# Patient Record
Sex: FEMALE | Race: WHITE | NOT HISPANIC OR LATINO | Employment: UNEMPLOYED | URBAN - METROPOLITAN AREA
[De-identification: names, ages, dates, MRNs, and addresses within clinical notes are randomized per-mention and may not be internally consistent; named-entity substitution may affect disease eponyms.]

---

## 2020-10-31 ENCOUNTER — APPOINTMENT (OUTPATIENT)
Dept: RADIOLOGY | Facility: MEDICAL CENTER | Age: 20
End: 2020-10-31
Attending: EMERGENCY MEDICINE
Payer: COMMERCIAL

## 2020-10-31 ENCOUNTER — HOSPITAL ENCOUNTER (EMERGENCY)
Facility: MEDICAL CENTER | Age: 20
End: 2020-10-31
Attending: EMERGENCY MEDICINE | Admitting: EMERGENCY MEDICINE
Payer: COMMERCIAL

## 2020-10-31 VITALS
TEMPERATURE: 97.5 F | HEART RATE: 56 BPM | DIASTOLIC BLOOD PRESSURE: 88 MMHG | OXYGEN SATURATION: 95 % | RESPIRATION RATE: 16 BRPM | SYSTOLIC BLOOD PRESSURE: 140 MMHG

## 2020-10-31 DIAGNOSIS — R11.2 NON-INTRACTABLE VOMITING WITH NAUSEA, UNSPECIFIED VOMITING TYPE: ICD-10-CM

## 2020-10-31 DIAGNOSIS — R10.13 EPIGASTRIC ABDOMINAL PAIN: ICD-10-CM

## 2020-10-31 DIAGNOSIS — D64.9 ANEMIA, UNSPECIFIED TYPE: ICD-10-CM

## 2020-10-31 LAB
ALBUMIN SERPL BCP-MCNC: 3.7 G/DL (ref 3.2–4.9)
ALBUMIN/GLOB SERPL: 1.4 G/DL
ALP SERPL-CCNC: 79 U/L (ref 30–99)
ALT SERPL-CCNC: 12 U/L (ref 2–50)
ANION GAP SERPL CALC-SCNC: 9 MMOL/L (ref 7–16)
AST SERPL-CCNC: 14 U/L (ref 12–45)
B-HCG SERPL-ACNC: <0.5 MIU/ML (ref 0–10)
BASOPHILS # BLD AUTO: 0.6 % (ref 0–1.8)
BASOPHILS # BLD: 0.05 K/UL (ref 0–0.12)
BILIRUB SERPL-MCNC: 0.3 MG/DL (ref 0.1–1.5)
BUN SERPL-MCNC: 7 MG/DL (ref 8–22)
CALCIUM SERPL-MCNC: 8.1 MG/DL (ref 8.4–10.2)
CHLORIDE SERPL-SCNC: 109 MMOL/L (ref 96–112)
CO2 SERPL-SCNC: 20 MMOL/L (ref 20–33)
CREAT SERPL-MCNC: 0.56 MG/DL (ref 0.5–1.4)
EOSINOPHIL # BLD AUTO: 0.17 K/UL (ref 0–0.51)
EOSINOPHIL NFR BLD: 1.9 % (ref 0–6.9)
ERYTHROCYTE [DISTWIDTH] IN BLOOD BY AUTOMATED COUNT: 46.9 FL (ref 35.9–50)
GLOBULIN SER CALC-MCNC: 2.7 G/DL (ref 1.9–3.5)
GLUCOSE SERPL-MCNC: 95 MG/DL (ref 65–99)
HCT VFR BLD AUTO: 31.5 % (ref 37–47)
HGB BLD-MCNC: 9.7 G/DL (ref 12–16)
IMM GRANULOCYTES # BLD AUTO: 0.03 K/UL (ref 0–0.11)
IMM GRANULOCYTES NFR BLD AUTO: 0.3 % (ref 0–0.9)
LIPASE SERPL-CCNC: 17 U/L (ref 7–58)
LYMPHOCYTES # BLD AUTO: 1.8 K/UL (ref 1–4.8)
LYMPHOCYTES NFR BLD: 20.2 % (ref 22–41)
MCH RBC QN AUTO: 21.9 PG (ref 27–33)
MCHC RBC AUTO-ENTMCNC: 30.8 G/DL (ref 33.6–35)
MCV RBC AUTO: 71.1 FL (ref 81.4–97.8)
MONOCYTES # BLD AUTO: 0.63 K/UL (ref 0–0.85)
MONOCYTES NFR BLD AUTO: 7.1 % (ref 0–13.4)
NEUTROPHILS # BLD AUTO: 6.24 K/UL (ref 2–7.15)
NEUTROPHILS NFR BLD: 69.9 % (ref 44–72)
NRBC # BLD AUTO: 0 K/UL
NRBC BLD-RTO: 0 /100 WBC
PLATELET # BLD AUTO: 357 K/UL (ref 164–446)
PMV BLD AUTO: 10.2 FL (ref 9–12.9)
POTASSIUM SERPL-SCNC: 3.7 MMOL/L (ref 3.6–5.5)
PROT SERPL-MCNC: 6.4 G/DL (ref 6–8.2)
RBC # BLD AUTO: 4.43 M/UL (ref 4.2–5.4)
SODIUM SERPL-SCNC: 138 MMOL/L (ref 135–145)
WBC # BLD AUTO: 8.9 K/UL (ref 4.8–10.8)

## 2020-10-31 PROCEDURE — 85025 COMPLETE CBC W/AUTO DIFF WBC: CPT

## 2020-10-31 PROCEDURE — 36415 COLL VENOUS BLD VENIPUNCTURE: CPT

## 2020-10-31 PROCEDURE — 84702 CHORIONIC GONADOTROPIN TEST: CPT

## 2020-10-31 PROCEDURE — 96375 TX/PRO/DX INJ NEW DRUG ADDON: CPT

## 2020-10-31 PROCEDURE — 700111 HCHG RX REV CODE 636 W/ 250 OVERRIDE (IP): Performed by: EMERGENCY MEDICINE

## 2020-10-31 PROCEDURE — 83690 ASSAY OF LIPASE: CPT

## 2020-10-31 PROCEDURE — 700105 HCHG RX REV CODE 258: Performed by: EMERGENCY MEDICINE

## 2020-10-31 PROCEDURE — 71045 X-RAY EXAM CHEST 1 VIEW: CPT

## 2020-10-31 PROCEDURE — 700102 HCHG RX REV CODE 250 W/ 637 OVERRIDE(OP): Performed by: EMERGENCY MEDICINE

## 2020-10-31 PROCEDURE — 99285 EMERGENCY DEPT VISIT HI MDM: CPT

## 2020-10-31 PROCEDURE — A9270 NON-COVERED ITEM OR SERVICE: HCPCS | Performed by: EMERGENCY MEDICINE

## 2020-10-31 PROCEDURE — 96374 THER/PROPH/DIAG INJ IV PUSH: CPT

## 2020-10-31 PROCEDURE — 80053 COMPREHEN METABOLIC PANEL: CPT

## 2020-10-31 RX ORDER — FAMOTIDINE 40 MG/1
40 TABLET, FILM COATED ORAL DAILY
Qty: 30 TAB | Refills: 0 | Status: SHIPPED | OUTPATIENT
Start: 2020-10-31 | End: 2020-11-30

## 2020-10-31 RX ORDER — METOCLOPRAMIDE HYDROCHLORIDE 5 MG/ML
10 INJECTION INTRAMUSCULAR; INTRAVENOUS ONCE
Status: COMPLETED | OUTPATIENT
Start: 2020-10-31 | End: 2020-10-31

## 2020-10-31 RX ORDER — DIPHENHYDRAMINE HYDROCHLORIDE 50 MG/ML
25 INJECTION INTRAMUSCULAR; INTRAVENOUS ONCE
Status: COMPLETED | OUTPATIENT
Start: 2020-10-31 | End: 2020-10-31

## 2020-10-31 RX ORDER — SODIUM CHLORIDE 9 MG/ML
1000 INJECTION, SOLUTION INTRAVENOUS ONCE
Status: COMPLETED | OUTPATIENT
Start: 2020-10-31 | End: 2020-10-31

## 2020-10-31 RX ORDER — ONDANSETRON 4 MG/1
4 TABLET, ORALLY DISINTEGRATING ORAL EVERY 6 HOURS PRN
Qty: 10 TAB | Refills: 0 | Status: SHIPPED | OUTPATIENT
Start: 2020-10-31 | End: 2020-11-03

## 2020-10-31 RX ADMIN — METOCLOPRAMIDE 10 MG: 5 INJECTION, SOLUTION INTRAMUSCULAR; INTRAVENOUS at 13:27

## 2020-10-31 RX ADMIN — SODIUM CHLORIDE 1000 ML: 9 INJECTION, SOLUTION INTRAVENOUS at 13:23

## 2020-10-31 RX ADMIN — DIPHENHYDRAMINE HYDROCHLORIDE 25 MG: 50 INJECTION INTRAMUSCULAR; INTRAVENOUS at 13:24

## 2020-10-31 RX ADMIN — LIDOCAINE HYDROCHLORIDE 30 ML: 20 SOLUTION OROPHARYNGEAL at 13:21

## 2020-10-31 NOTE — ED TRIAGE NOTES
Chief Complaint   Patient presents with   • Abdominal Pain     Pt reports drank alcohol and did cocaine yesterday, reports HX of GERD     /92   Pulse 62   Temp 36.4 °C (97.5 °F) (Temporal)   Resp 20   LMP 09/07/2020   SpO2 100%     [place] [facility] [place] c/o abd pain and N/V s/p drinking and using cocaine.  Pt was given 500mL NS and 12.5mg Phenergan via [facility] PTA.    Covid Screen Negative.

## 2020-10-31 NOTE — ED PROVIDER NOTES
ED Provider Note  CHIEF COMPLAINT  Chief Complaint   Patient presents with   • Abdominal Pain     Pt reports drank alcohol and did cocaine yesterday, reports HX of GERD       HPI  Sara Meeks is a 20 y.o. female who presents with epigastric abdominal pain had nausea and vomiting.  She complains of a feeling of stomach acid in her epigastric area.  She was complaining of some hot and cold flashes.  She also complains of some mild difficulty breathing.  No chest pain.  Patient admits to excessive alcohol and cocaine use last night.  She has had some streaks of blood in her vomit but no coffee-ground emesis or repeated episodes of vomiting blood.  No black stools.  She denies pregnancy.  She states she has felt like this before and a GI cocktail usually helps.  She denies any recent cough.  No fevers.  No known exposure to COVID-19.  Denies pregnancy.  No dysuria or hematuria.  No back pain.  No leg swelling.    REVIEW OF SYSTEMS  See HPI for further details. All other systems are negative.     PAST MEDICAL HISTORY  Past Medical History:   Diagnosis Date   • GERD (gastroesophageal reflux disease)        FAMILY HISTORY  [unfilled]    SOCIAL HISTORY  Social History     Socioeconomic History   • Marital status: Not on file     Spouse name: Not on file   • Number of children: Not on file   • Years of education: Not on file   • Highest education level: Not on file   Occupational History   • Not on file   Social Needs   • Financial resource strain: Not on file   • Food insecurity     Worry: Not on file     Inability: Not on file   • Transportation needs     Medical: Not on file     Non-medical: Not on file   Tobacco Use   • Smoking status: Never Smoker   Substance and Sexual Activity   • Alcohol use: Yes   • Drug use: Yes     Comment: Cocaine   • Sexual activity: Not on file   Lifestyle   • Physical activity     Days per week: Not on file     Minutes per session: Not on file   • Stress: Not on file   Relationships   •  Social connections     Talks on phone: Not on file     Gets together: Not on file     Attends Alevism service: Not on file     Active member of club or organization: Not on file     Attends meetings of clubs or organizations: Not on file     Relationship status: Not on file   • Intimate partner violence     Fear of current or ex partner: Not on file     Emotionally abused: Not on file     Physically abused: Not on file     Forced sexual activity: Not on file   Other Topics Concern   • Not on file   Social History Narrative   • Not on file       SURGICAL HISTORY  No past surgical history on file.    CURRENT MEDICATIONS   Home Medications    **Home medications have not yet been reviewed for this encounter**         ALLERGIES  No Known Allergies    PHYSICAL EXAM  VITAL SIGNS: /92   Pulse 62   Temp 36.4 °C (97.5 °F) (Temporal)   Resp 20   LMP 09/07/2020   SpO2 100%       Constitutional: Well developed, mild acute distress, Non-toxic appearance.  Disheveled  HENT: Normocephalic, Atraumatic, Bilateral external ears normal, No oral exudates, Nose normal.   Eyes: PERRL, EOMI, Conjunctiva normal  Neck: Normal range of motion, No tenderness, Supple  Cardiovascular: Normal heart rate, Normal rhythm  Thorax & Lungs: Normal breath sounds, No respiratory distress, No wheezing, No chest tenderness.   Abdomen: Minimal epigastric tenderness to palpation, negative Sabillon's, no right upper quadrant tenderness, no rebound or guarding, no distention, positive bowel sounds  Skin: Warm, Dry, No erythema  Back: No tenderness, No CVA tenderness.   Extremities: Intact distal pulses, No edema, No tenderness   Neurologic: Alert & oriented x 3, Normal motor function, Normal sensory function, No focal deficits noted.   Psychiatric: appropriate    Labs  Results for orders placed or performed during the hospital encounter of 10/31/20   CBC WITH DIFFERENTIAL   Result Value Ref Range    WBC 8.9 4.8 - 10.8 K/uL    RBC 4.43 4.20 - 5.40  M/uL    Hemoglobin 9.7 (L) 12.0 - 16.0 g/dL    Hematocrit 31.5 (L) 37.0 - 47.0 %    MCV 71.1 (L) 81.4 - 97.8 fL    MCH 21.9 (L) 27.0 - 33.0 pg    MCHC 30.8 (L) 33.6 - 35.0 g/dL    RDW 46.9 35.9 - 50.0 fL    Platelet Count 357 164 - 446 K/uL    MPV 10.2 9.0 - 12.9 fL    Neutrophils-Polys 69.90 44.00 - 72.00 %    Lymphocytes 20.20 (L) 22.00 - 41.00 %    Monocytes 7.10 0.00 - 13.40 %    Eosinophils 1.90 0.00 - 6.90 %    Basophils 0.60 0.00 - 1.80 %    Immature Granulocytes 0.30 0.00 - 0.90 %    Nucleated RBC 0.00 /100 WBC    Neutrophils (Absolute) 6.24 2.00 - 7.15 K/uL    Lymphs (Absolute) 1.80 1.00 - 4.80 K/uL    Monos (Absolute) 0.63 0.00 - 0.85 K/uL    Eos (Absolute) 0.17 0.00 - 0.51 K/uL    Baso (Absolute) 0.05 0.00 - 0.12 K/uL    Immature Granulocytes (abs) 0.03 0.00 - 0.11 K/uL    NRBC (Absolute) 0.00 K/uL   COMP METABOLIC PANEL   Result Value Ref Range    Sodium 138 135 - 145 mmol/L    Potassium 3.7 3.6 - 5.5 mmol/L    Chloride 109 96 - 112 mmol/L    Co2 20 20 - 33 mmol/L    Anion Gap 9.0 7.0 - 16.0    Glucose 95 65 - 99 mg/dL    Bun 7 (L) 8 - 22 mg/dL    Creatinine 0.56 0.50 - 1.40 mg/dL    Calcium 8.1 (L) 8.4 - 10.2 mg/dL    AST(SGOT) 14 12 - 45 U/L    ALT(SGPT) 12 2 - 50 U/L    Alkaline Phosphatase 79 30 - 99 U/L    Total Bilirubin 0.3 0.1 - 1.5 mg/dL    Albumin 3.7 3.2 - 4.9 g/dL    Total Protein 6.4 6.0 - 8.2 g/dL    Globulin 2.7 1.9 - 3.5 g/dL    A-G Ratio 1.4 g/dL   LIPASE   Result Value Ref Range    Lipase 17 7 - 58 U/L   HCG QUANTITATIVE   Result Value Ref Range    Bhcg <0.5 0.0 - 10.0 mIU/mL   ESTIMATED GFR   Result Value Ref Range    GFR If African American >60 >60 mL/min/1.73 m 2    GFR If Non African American >60 >60 mL/min/1.73 m 2     RADIOLOGY/PROCEDURES  DX-CHEST-PORTABLE (1 VIEW)   Final Result      No acute cardiopulmonary abnormality identified. .          COURSE & MEDICAL DECISION MAKING  Pertinent Labs & Imaging studies reviewed. (See chart for details)  Patient presents with nausea  vomiting and epigastric pain after a night of alcohol and cocaine.  Patient has no peritoneal signs on abdominal exam.  There was an episode of blood in her vomit that was streaky.  She only had one episode.  She not had any black stools.  She is not tachycardic or hypotensive.  I doubt a GI bleed.  Patient has had GERD in the past and states the epigastric pain feels like GERD.  We will try a GI cocktail once nausea and vomiting is controlled.  She has no peritoneal signs on exam.  Will check labs to rule out electrolyte abnormalities or hepatitis or pancreatitis.  She did complain of some mild shortness of breath.  She is not hypoxic here or any acute respiratory distress.  Lungs sound clear.  Will obtain a chest x-ray.  No known exposure to COVID-19.    Patient's laboratory studies have returned and show a normal white count without evidence of bandemia.  Patient is anemic.  I do not have old labs to compare.  Patient has no evidence of pancreatitis or hepatitis.  There is no evidence of significant electrolyte abnormalities.  Chest x-ray was unremarkable.  Patient has not had any further episodes of vomiting here in the emergency department.  She is feeling better and states she would like to go home.  Although she is anemic I do not suspect the small amount of blood streaks in her vomit is the etiology of the anemia.  She had an episode of vomiting here and it was just yellow in color.  I will treat her with Pepcid for possible gastritis.  She was also be discharged with Zofran.  Strict return precautions were given.  She has not had any episodes of hypotension or tachycardia here.  Repeat abdominal exam is benign and she is feeling better and tolerating p.o.  She was advised to follow-up concerning her anemia.  Also advised to return if she has any further blood in her vomit.    FINAL IMPRESSION     1. Anemia, unspecified type    2. Non-intractable vomiting with nausea, unspecified vomiting type    3.  Epigastric abdominal pain             Electronically signed by: Sil Hoffman M.D., 10/31/2020 1:11 PM

## 2020-10-31 NOTE — ED NOTES
Discharge instructions provided.  Pt verbalized the understanding of discharge instructions to follow up with PCP and to return to ER if condition worsens.  Pt ambulated out of ER without difficulty. Rx 2

## 2020-10-31 NOTE — DISCHARGE INSTRUCTIONS
Your labs show that you are anemic.  You need to follow-up with your regular doctor to get these labs rechecked.  Go home and rest and drink plenty of fluids.  Any worsening pain, black poops, vomiting blood or any other concerns return.

## 2020-10-31 NOTE — ED NOTES
Pt was evaluated by MD  Orders rec'ed and noted  bld drawn, NS up and infusing well  IV and PO med's given per order   VSS